# Patient Record
Sex: MALE | Race: WHITE | NOT HISPANIC OR LATINO | Employment: FULL TIME | URBAN - METROPOLITAN AREA
[De-identification: names, ages, dates, MRNs, and addresses within clinical notes are randomized per-mention and may not be internally consistent; named-entity substitution may affect disease eponyms.]

---

## 2023-08-08 ENCOUNTER — APPOINTMENT (EMERGENCY)
Dept: RADIOLOGY | Facility: HOSPITAL | Age: 28
End: 2023-08-08
Payer: OTHER MISCELLANEOUS

## 2023-08-08 ENCOUNTER — HOSPITAL ENCOUNTER (EMERGENCY)
Facility: HOSPITAL | Age: 28
Discharge: HOME | End: 2023-08-08
Attending: EMERGENCY MEDICINE | Admitting: EMERGENCY MEDICINE
Payer: OTHER MISCELLANEOUS

## 2023-08-08 VITALS
SYSTOLIC BLOOD PRESSURE: 146 MMHG | TEMPERATURE: 98.7 F | HEIGHT: 70 IN | HEART RATE: 71 BPM | BODY MASS INDEX: 27.2 KG/M2 | WEIGHT: 190 LBS | DIASTOLIC BLOOD PRESSURE: 95 MMHG | RESPIRATION RATE: 18 BRPM | OXYGEN SATURATION: 98 %

## 2023-08-08 DIAGNOSIS — S90.31XA CONTUSION OF RIGHT FOOT, INITIAL ENCOUNTER: Primary | ICD-10-CM

## 2023-08-08 PROCEDURE — 73630 X-RAY EXAM OF FOOT: CPT

## 2023-08-08 PROCEDURE — 73610 X-RAY EXAM OF ANKLE: CPT

## 2023-08-08 RX ORDER — IBUPROFEN 600 MG/1
600 TABLET ORAL ONCE
Status: COMPLETED | OUTPATIENT
Start: 2023-08-08 | End: 2023-08-08

## 2023-08-08 RX ADMIN — IBUPROFEN 600 MG: 600 TABLET ORAL at 12:32

## 2023-08-08 NOTE — Clinical Note
Fidearthur Vipul was seen and treated in our emergency department on 8/8/2023. Diagnosis:     Elaina Kaye  may return to work on return date. He may return on this date: 08/10/2023         If you have any questions or concerns, please don't hesitate to call.       Dari June PA-C    ______________________________           _______________          _______________  Hospital Representative                              Date                                Time

## 2023-08-08 NOTE — Clinical Note
Donna Germain was seen and treated in our emergency department on 8/8/2023. Diagnosis:     Tres Trejo  may return to work on return date. He may return on this date: 08/10/2023         If you have any questions or concerns, please don't hesitate to call.       nAtolin Benítez PA-C    ______________________________           _______________          _______________  Hospital Representative                              Date                                Time

## 2023-08-08 NOTE — ED PROVIDER NOTES
History  Chief Complaint   Patient presents with   • Ankle Injury     Ran over foot with scissor lift this AM. Unable to bare weight. 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  He denies any other injuries. No numbness or weakness. No prior injury to foot. He did not take anything for pain. He states he was able to walk without difficulty, but as the day goes on the pain is worsening. History provided by:  Patient  Ankle Injury  Associated symptoms: no fever and no rash        None       History reviewed. No pertinent past medical history. History reviewed. No pertinent surgical history. History reviewed. No pertinent family history. I have reviewed and agree with the history as documented. E-Cigarette/Vaping   • E-Cigarette Use Never User      E-Cigarette/Vaping Substances     Social History     Tobacco Use   • Smoking status: Never   • Smokeless tobacco: Never   Vaping Use   • Vaping Use: Never used   Substance Use Topics   • Alcohol use: Never   • Drug use: Yes     Types: Marijuana       Review of Systems   Constitutional: Negative for chills and fever. Musculoskeletal:        Right foot pain   Skin: Negative for color change, rash and wound. Neurological: Negative for dizziness, weakness and numbness. All other systems reviewed and are negative. Physical Exam  Physical Exam  Vitals and nursing note reviewed. Constitutional:       General: He is not in acute distress. Appearance: Normal appearance. He is well-developed, well-groomed and normal weight. He is not ill-appearing or diaphoretic. HENT:      Head: Normocephalic and atraumatic. Right Ear: Hearing normal.      Left Ear: Hearing normal.      Nose: Nose normal.   Eyes:      Conjunctiva/sclera: Conjunctivae normal.   Cardiovascular:      Rate and Rhythm: Normal rate.       Pulses:           Dorsalis pedis pulses are 2+ on the right side. Pulmonary:      Effort: Pulmonary effort is normal.   Musculoskeletal:      Cervical back: Normal range of motion. Right knee: Normal.      Right lower leg: Normal.      Right ankle: Normal.      Right foot: Normal range of motion. Bony tenderness (over the proximal 5th metatarsal. ) present. No swelling or deformity. Normal pulse. Skin:     General: Skin is warm and dry. Findings: No bruising, erythema, rash or wound. Neurological:      Mental Status: He is alert and oriented to person, place, and time. Sensory: Sensation is intact. Motor: Motor function is intact. Gait: Gait is intact. Psychiatric:         Mood and Affect: Mood normal.         Speech: Speech normal.         Behavior: Behavior is cooperative. Vital Signs  ED Triage Vitals [08/08/23 1206]   Temperature Pulse Respirations Blood Pressure SpO2   98.7 °F (37.1 °C) 71 18 146/95 98 %      Temp Source Heart Rate Source Patient Position - Orthostatic VS BP Location FiO2 (%)   Temporal Monitor Sitting Left arm --      Pain Score       7           Vitals:    08/08/23 1206 08/08/23 1215   BP: 146/95 146/95   Pulse: 71    Patient Position - Orthostatic VS: Sitting          Visual Acuity      ED Medications  Medications   ibuprofen (MOTRIN) tablet 600 mg (600 mg Oral Given 8/8/23 1232)       Diagnostic Studies  Results Reviewed     None                 XR foot 3+ views RIGHT   ED Interpretation by Melissa Cruz PA-C (08/08 1228)   No acute fracture. XR ankle 3+ vw right   ED Interpretation by Melissa Cruz PA-C (08/08 1228)   No acute fracture. Procedures  Splint application    Date/Time: 8/8/2023 12:30 PM    Performed by: Melissa Cruz PA-C  Authorized by: Melissa Cruz PA-C  Universal Protocol:  Procedure performed by:  Consent: Verbal consent obtained.   Consent given by: patient      Pre-procedure details: Sensation:  Normal  Procedure details:     Laterality:  Right    Location:  Foot    Foot:  R foot    Supplies:  Elastic bandage  Post-procedure details:     Pain:  Unchanged    Sensation:  Normal    Patient tolerance of procedure: Tolerated well, no immediate complications             ED Course                                             Medical Decision Making  Patient with right foot pain after running it over with forklift, will order xray to r/o fracture. No acute abnormalities on xray independently interpreted by me. Contusion of right foot, initial encounter: acute illness or injury  Amount and/or Complexity of Data Reviewed  Radiology: ordered and independent interpretation performed. Risk  Prescription drug management. Disposition  Final diagnoses:   Contusion of right foot, initial encounter     Time reflects when diagnosis was documented in both MDM as applicable and the Disposition within this note     Time User Action Codes Description Comment    8/8/2023 12:28 PM Andrew Gtz Contusion of right foot, initial encounter       ED Disposition     ED Disposition   Discharge    Condition   Stable    Date/Time   Tue Aug 8, 2023 12:28 PM    Comment   Piyush Eastamn discharge to home/self care. Follow-up Information     Follow up With Specialties Details Why Contact Info Additional [address] [facility] Schedule an appointment as soon as possible for a visit in 1 week As needed, For recheck [place] [phone]  [facility], [address], [address] [facility], [place], [address]          There are no discharge medications for this patient. No discharge procedures on file.     PDMP Review     None          ED Provider  Electronically Signed by           Len Mora MARIAA  08/08/23 [id number]

## 2023-08-08 NOTE — Clinical Note
Harleen Campbell was seen and treated in our emergency department on 8/8/2023. Diagnosis:     Mei Harris  may return to work on return date. He may return on this date: 08/10/2023         If you have any questions or concerns, please don't hesitate to call.       Syeda Barbosa PA-C    ______________________________           _______________          _______________  Hospital Representative                              Date                                Time

## 2023-08-08 NOTE — DISCHARGE INSTRUCTIONS
Rest, ice, elevate foot. Tylenol/motrin for discomfort. Follow up with ortho if no improvement in 1 week.

## 2023-08-15 ENCOUNTER — TELEPHONE (OUTPATIENT)
Age: 28
End: 2023-08-15